# Patient Record
Sex: FEMALE | Race: WHITE | NOT HISPANIC OR LATINO | URBAN - METROPOLITAN AREA
[De-identification: names, ages, dates, MRNs, and addresses within clinical notes are randomized per-mention and may not be internally consistent; named-entity substitution may affect disease eponyms.]

---

## 2022-03-17 ENCOUNTER — OFFICE VISIT (OUTPATIENT)
Dept: URBAN - METROPOLITAN AREA CLINIC 98 | Facility: CLINIC | Age: 37
End: 2022-03-17
Payer: COMMERCIAL

## 2022-03-17 ENCOUNTER — WEB ENCOUNTER (OUTPATIENT)
Dept: URBAN - METROPOLITAN AREA CLINIC 98 | Facility: CLINIC | Age: 37
End: 2022-03-17

## 2022-03-17 VITALS
HEART RATE: 72 BPM | BODY MASS INDEX: 22.76 KG/M2 | DIASTOLIC BLOOD PRESSURE: 77 MMHG | WEIGHT: 145 LBS | TEMPERATURE: 96.4 F | HEIGHT: 67 IN | SYSTOLIC BLOOD PRESSURE: 121 MMHG

## 2022-03-17 DIAGNOSIS — R13.10 ODYNOPHAGIA: ICD-10-CM

## 2022-03-17 PROCEDURE — 99214 OFFICE O/P EST MOD 30 MIN: CPT | Performed by: INTERNAL MEDICINE

## 2022-03-17 RX ORDER — SUCRALFATE 1 G/1
1 TABLET ON AN EMPTY STOMACH TABLET ORAL
Qty: 20 | Refills: 0 | OUTPATIENT
Start: 2022-03-17 | End: 2022-03-27

## 2022-03-17 RX ORDER — CHOLECALCIFEROL (VITAMIN D3) 50 MCG
1 TABLET TABLET ORAL ONCE A DAY
Status: ACTIVE | COMMUNITY

## 2022-03-17 RX ORDER — VIT C/ZN GLUC/HERBAL NO.325 90 MG-15MG
AS DIRECTED LOZENGE MUCOUS MEMBRANE
Status: ACTIVE | COMMUNITY

## 2022-03-17 RX ORDER — ECHINACEA 400 MG
AS DIRECTED CAPSULE ORAL
Status: ACTIVE | COMMUNITY

## 2022-03-17 NOTE — HPI-TODAY'S VISIT:
Ms. Vásquez is a 35 yo F presenting for followup.  She had her first child in 2019 and she developed a sore throat around that time.  ENT treated her with steroids which improved it and she was given antacids which did not help.  She had acupuncture and saw a chiropractor.  She feels her voice is getting hoarse. It is better during the day and was better when she was pregnant with her second child last year.  She does have the sore throat all day.  She has avoided red wine and coffee which does not change it much.  No dysphagia.  No heartburn or acid regurgitation symptoms.  NO unintentional weight loss.   No NSAID use.    I reviewed:  2019 flex sig: internal hemorrhoids

## 2022-03-17 NOTE — EXAM-FUNCTIONAL ASSESSMENT
Constitutional: well-developed, normal communication ability. Mouth: normal oropharnyx, no lesions noted   Eyes: Conjunctivae and eyelids appear normal, no scleral icterus. Respiratory: symmetric expansion of chest wall, normal work of breathing   Gastrointestinal:  normoactive bowel sounds, soft, no tenderness, no rebound tenderness, no shifting dullness, no organomegaly.   Musculoskeletal: normal gait and station   Skin: no jaundice   Neurologic: Oriented to person, place, time. Short term memory intact.    Psychiatric: Normal mood and appropriate affect.

## 2022-03-22 ENCOUNTER — OFFICE VISIT (OUTPATIENT)
Dept: URBAN - METROPOLITAN AREA SURGERY CENTER 18 | Facility: SURGERY CENTER | Age: 37
End: 2022-03-22
Payer: COMMERCIAL

## 2022-03-22 DIAGNOSIS — K22.89 OTHER SPECIFIED DISEASE OF ESOPHAGUS: ICD-10-CM

## 2022-03-22 DIAGNOSIS — R13.10 ODYNOPHAGIA: ICD-10-CM

## 2022-03-22 PROCEDURE — 43239 EGD BIOPSY SINGLE/MULTIPLE: CPT | Performed by: INTERNAL MEDICINE

## 2022-03-22 PROCEDURE — G8907 PT DOC NO EVENTS ON DISCHARG: HCPCS | Performed by: INTERNAL MEDICINE

## 2022-03-22 RX ORDER — SUCRALFATE 1 G/1
1 TABLET ON AN EMPTY STOMACH TABLET ORAL
Qty: 20 | Refills: 0 | Status: ACTIVE | COMMUNITY
Start: 2022-03-17 | End: 2022-03-27

## 2022-03-22 RX ORDER — CHOLECALCIFEROL (VITAMIN D3) 50 MCG
1 TABLET TABLET ORAL ONCE A DAY
Status: ACTIVE | COMMUNITY

## 2022-03-22 RX ORDER — ECHINACEA 400 MG
AS DIRECTED CAPSULE ORAL
Status: ACTIVE | COMMUNITY

## 2022-03-22 RX ORDER — VIT C/ZN GLUC/HERBAL NO.325 90 MG-15MG
AS DIRECTED LOZENGE MUCOUS MEMBRANE
Status: ACTIVE | COMMUNITY

## 2022-04-19 ENCOUNTER — DASHBOARD ENCOUNTERS (OUTPATIENT)
Age: 37
End: 2022-04-19

## 2022-04-21 ENCOUNTER — WEB ENCOUNTER (OUTPATIENT)
Dept: URBAN - METROPOLITAN AREA CLINIC 98 | Facility: CLINIC | Age: 37
End: 2022-04-21

## 2022-04-22 ENCOUNTER — LAB OUTSIDE AN ENCOUNTER (OUTPATIENT)
Dept: URBAN - METROPOLITAN AREA CLINIC 98 | Facility: CLINIC | Age: 37
End: 2022-04-22

## 2022-04-22 ENCOUNTER — OFFICE VISIT (OUTPATIENT)
Dept: URBAN - METROPOLITAN AREA CLINIC 98 | Facility: CLINIC | Age: 37
End: 2022-04-22
Payer: COMMERCIAL

## 2022-04-22 VITALS
TEMPERATURE: 97 F | WEIGHT: 140 LBS | HEIGHT: 67 IN | DIASTOLIC BLOOD PRESSURE: 72 MMHG | SYSTOLIC BLOOD PRESSURE: 124 MMHG | HEART RATE: 80 BPM | BODY MASS INDEX: 21.97 KG/M2

## 2022-04-22 DIAGNOSIS — R13.10 ODYNOPHAGIA: ICD-10-CM

## 2022-04-22 DIAGNOSIS — R59.9 LYMPH NODE ENLARGEMENT: ICD-10-CM

## 2022-04-22 DIAGNOSIS — R07.0 THROAT PAIN: ICD-10-CM

## 2022-04-22 DIAGNOSIS — Z12.11 COLON CANCER SCREENING: ICD-10-CM

## 2022-04-22 PROBLEM — 30746006: Status: ACTIVE | Noted: 2022-04-22

## 2022-04-22 PROBLEM — 30233002: Status: ACTIVE | Noted: 2022-03-17

## 2022-04-22 PROCEDURE — G8482 FLU IMMUNIZE ORDER/ADMIN: HCPCS | Performed by: INTERNAL MEDICINE

## 2022-04-22 PROCEDURE — 99214 OFFICE O/P EST MOD 30 MIN: CPT | Performed by: INTERNAL MEDICINE

## 2022-04-22 PROCEDURE — G8427 DOCREV CUR MEDS BY ELIG CLIN: HCPCS | Performed by: INTERNAL MEDICINE

## 2022-04-22 PROCEDURE — 3017F COLORECTAL CA SCREEN DOC REV: CPT | Performed by: INTERNAL MEDICINE

## 2022-04-22 PROCEDURE — G9903 PT SCRN TBCO ID AS NON USER: HCPCS | Performed by: INTERNAL MEDICINE

## 2022-04-22 PROCEDURE — G8420 CALC BMI NORM PARAMETERS: HCPCS | Performed by: INTERNAL MEDICINE

## 2022-04-22 RX ORDER — ECHINACEA 400 MG
AS DIRECTED CAPSULE ORAL
Status: ACTIVE | COMMUNITY

## 2022-04-22 RX ORDER — VIT C/ZN GLUC/HERBAL NO.325 90 MG-15MG
AS DIRECTED LOZENGE MUCOUS MEMBRANE
Status: ACTIVE | COMMUNITY

## 2022-04-22 RX ORDER — CHOLECALCIFEROL (VITAMIN D3) 50 MCG
1 TABLET TABLET ORAL ONCE A DAY
Status: ACTIVE | COMMUNITY

## 2022-04-22 NOTE — HPI-TODAY'S VISIT:
f/u visit Recent EGD without concerning pathology.  Biopsies of prox/distal esophagus reviewed She continues to have sore throat that has progressed over the past 3 years Multiple ENT evaluations Notes lymph node enlargement on the left    . PRIOR VISIT: Ms. Vásquez is a 35 yo F presenting for followup.  She had her first child in 2019 and she developed a sore throat around that time.  ENT treated her with steroids which improved it and she was given antacids which did not help.  She had acupuncture and saw a chiropractor.  She feels her voice is getting hoarse. It is better during the day and was better when she was pregnant with her second child last year.  She does have the sore throat all day.  She has avoided red wine and coffee which does not change it much.  No dysphagia.  No heartburn or acid regurgitation symptoms.  NO unintentional weight loss.   No NSAID use.    I reviewed:  2019 flex sig: internal hemorrhoids

## 2022-04-23 LAB
A/G RATIO: 2
ALBUMIN: 4.9
ALKALINE PHOSPHATASE: 61
ALT (SGPT): 11
AST (SGOT): 14
BASO (ABSOLUTE): 0.1
BASOS: 1
BILIRUBIN, TOTAL: 0.5
BUN/CREATININE RATIO: 12
BUN: 11
CALCIUM: 9.9
CARBON DIOXIDE, TOTAL: 24
CHLORIDE: 102
CREATININE: 0.92
EGFR: 83
EOS (ABSOLUTE): 0.1
EOS: 1
GLOBULIN, TOTAL: 2.4
GLUCOSE: 136
HEMATOCRIT: 38.9
HEMATOLOGY COMMENTS:: (no result)
HEMOGLOBIN: 12.9
IMMATURE CELLS: (no result)
IMMATURE GRANS (ABS): 0
IMMATURE GRANULOCYTES: 0
LYMPHS (ABSOLUTE): 2
LYMPHS: 27
MCH: 30.9
MCHC: 33.2
MCV: 93
MONOCYTES(ABSOLUTE): 0.6
MONOCYTES: 8
NEUTROPHILS (ABSOLUTE): 4.5
NEUTROPHILS: 63
NRBC: (no result)
PLATELETS: 322
POTASSIUM: 4.1
PROTEIN, TOTAL: 7.3
RBC: 4.17
RDW: 11.7
SODIUM: 140
T4,FREE(DIRECT): 1.22
TSH: 1.6
WBC: 7.2

## 2022-05-05 ENCOUNTER — LAB OUTSIDE AN ENCOUNTER (OUTPATIENT)
Dept: URBAN - METROPOLITAN AREA CLINIC 98 | Facility: CLINIC | Age: 37
End: 2022-05-05

## 2022-05-05 ENCOUNTER — TELEPHONE ENCOUNTER (OUTPATIENT)
Dept: URBAN - METROPOLITAN AREA CLINIC 98 | Facility: CLINIC | Age: 37
End: 2022-05-05

## 2024-10-08 ENCOUNTER — OFFICE VISIT (OUTPATIENT)
Dept: URBAN - METROPOLITAN AREA CLINIC 105 | Facility: CLINIC | Age: 39
End: 2024-10-08
Payer: COMMERCIAL

## 2024-10-08 VITALS
TEMPERATURE: 97.1 F | DIASTOLIC BLOOD PRESSURE: 81 MMHG | HEIGHT: 67 IN | BODY MASS INDEX: 21.97 KG/M2 | WEIGHT: 140 LBS | HEART RATE: 73 BPM | SYSTOLIC BLOOD PRESSURE: 121 MMHG

## 2024-10-08 DIAGNOSIS — Z80.0 FAMILY HISTORY OF COLON CANCER: ICD-10-CM

## 2024-10-08 DIAGNOSIS — L29.0 ANAL ITCHING: ICD-10-CM

## 2024-10-08 DIAGNOSIS — K64.8 INTERNAL HEMORRHOIDS: ICD-10-CM

## 2024-10-08 PROCEDURE — 99213 OFFICE O/P EST LOW 20 MIN: CPT

## 2024-10-08 RX ORDER — ECHINACEA 400 MG
AS DIRECTED CAPSULE ORAL
Status: ON HOLD | COMMUNITY

## 2024-10-08 RX ORDER — HYDROCORTISONE ACETATE 25 MG/1
1 SUPPOSITORY SUPPOSITORY RECTAL ONCE A DAY
Qty: 14 | Refills: 0 | OUTPATIENT
Start: 2024-10-08 | End: 2024-10-22

## 2024-10-08 RX ORDER — CHOLECALCIFEROL (VITAMIN D3) 50 MCG
1 TABLET TABLET ORAL ONCE A DAY
Status: ON HOLD | COMMUNITY

## 2024-10-08 RX ORDER — VIT C/ZN GLUC/HERBAL NO.325 90 MG-15MG
AS DIRECTED LOZENGE MUCOUS MEMBRANE
Status: ON HOLD | COMMUNITY

## 2024-10-08 NOTE — PHYSICAL EXAM GASTROINTESTINAL
Abdomen , nondistended , Rectal , normal sphincter tone, small non-thrombosed internal hemorrhoids with one prolapsed slightly, no external hemorrhoids, no rectal masses, no bleeding present. No abnormalities of perianal skin. A chaperone was present during the rectal examination

## 2024-10-08 NOTE — HPI-ZZZTODAY'S VISIT
39-year-old female with PMH of hemorrhoids, presenting for consultation.  She was last seen 4/22/2022 by Dr. Brett Mendel for follow-up after EGD, done due to throat pain.  At that time, labs and ultrasound head/neck ordered. Ultrasound showed bilateral enlarged upper cervical lymph nodes, and she was ordered MRI, which showed some upper limit of normal size upper cervical chain lymph nodes, favored to be reactive.  Today, pt states since she had kids, she has had hemorrhoids on and off. Since 5/2024, she has had itching on the skin around the anus. States it will go away for a couple of weeks and then come back. It is worse after going to the bathroom. There is associated stinging, but no pain. Denies rectal bleeding. She is unsure if it is related to hemorrhoid. Her typical hemorrhoid symptoms would be sensation of lump. Never had itching before. States she did have a yeast infection around the time this started and she took Diflucan. That has resolved, but still having anal itching. She has tried to use hydrocortisone cream and ringworm cream on area periodically. She thinks this does help for some time. No change in bowel habits. No contacts with similar symptoms of itching. She has a family history of colon cancer in [facility] [place] (in her 80s). No known family history of colon polyps.

## 2024-11-05 ENCOUNTER — OFFICE VISIT (OUTPATIENT)
Dept: URBAN - METROPOLITAN AREA CLINIC 105 | Facility: CLINIC | Age: 39
End: 2024-11-05
Payer: COMMERCIAL

## 2024-11-05 VITALS
TEMPERATURE: 96.6 F | HEIGHT: 67 IN | HEART RATE: 71 BPM | SYSTOLIC BLOOD PRESSURE: 108 MMHG | DIASTOLIC BLOOD PRESSURE: 71 MMHG | WEIGHT: 139 LBS | BODY MASS INDEX: 21.82 KG/M2

## 2024-11-05 DIAGNOSIS — K64.8 INTERNAL HEMORRHOIDS: ICD-10-CM

## 2024-11-05 DIAGNOSIS — Z80.0 FAMILY HISTORY OF COLON CANCER: ICD-10-CM

## 2024-11-05 DIAGNOSIS — L29.0 ANAL ITCHING: ICD-10-CM

## 2024-11-05 PROCEDURE — 99213 OFFICE O/P EST LOW 20 MIN: CPT | Performed by: INTERNAL MEDICINE

## 2024-11-05 RX ORDER — CHOLECALCIFEROL (VITAMIN D3) 50 MCG
1 TABLET TABLET ORAL ONCE A DAY
Status: ON HOLD | COMMUNITY

## 2024-11-05 RX ORDER — ECHINACEA 400 MG
AS DIRECTED CAPSULE ORAL
Status: ON HOLD | COMMUNITY

## 2024-11-05 RX ORDER — VIT C/ZN GLUC/HERBAL NO.325 90 MG-15MG
AS DIRECTED LOZENGE MUCOUS MEMBRANE
Status: ON HOLD | COMMUNITY

## 2024-11-05 NOTE — HPI-ZZZTODAY'S VISIT
10/08/2024 39-year-old female with PMH of hemorrhoids, presenting for consultation.  She was last seen 4/22/2022 by Dr. Brett Mendel for follow-up after EGD, done due to throat pain.  At that time, labs and ultrasound head/neck ordered. Ultrasound showed bilateral enlarged upper cervical lymph nodes, and she was ordered MRI, which showed some upper limit of normal size upper cervical chain lymph nodes, favored to be reactive. Today, pt states since she had kids, she has had hemorrhoids on and off. Since 5/2024, she has had itching on the skin around the anus. States it will go away for a couple of weeks and then come back. It is worse after going to the bathroom. There is associated stinging, but no pain. Denies rectal bleeding. She is unsure if it is related to hemorrhoid. Her typical hemorrhoid symptoms would be sensation of lump. Never had itching before. States she did have a yeast infection around the time this started and she took Diflucan. That has resolved, but still having anal itching. She has tried to use hydrocortisone cream and ringworm cream on area periodically. She thinks this does help for some time. No change in bowel habits. No contacts with similar symptoms of itching. She has a family history of colon cancer in [facility] (in her 80s). No known family history of colon polyps.  11/05/2024 Pt presents for f/u. At last visit, she was tx for hemorrhoids with Anusol suppositories. Consider referral to dermatology if no improvement. Pt states symptoms are not worse, but not completely resolved. Not as much burning, but still itching. No new bleeding or discharge. She did steroid suppositories for 2 weeks total.